# Patient Record
Sex: FEMALE | Race: WHITE | Employment: STUDENT | URBAN - NONMETROPOLITAN AREA
[De-identification: names, ages, dates, MRNs, and addresses within clinical notes are randomized per-mention and may not be internally consistent; named-entity substitution may affect disease eponyms.]

---
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
## 2025-01-02 ENCOUNTER — OFFICE VISIT (OUTPATIENT)
Dept: SURGERY | Facility: CLINIC | Age: 20
End: 2025-01-02
Payer: COMMERCIAL

## 2025-01-02 VITALS
DIASTOLIC BLOOD PRESSURE: 74 MMHG | BODY MASS INDEX: 18.07 KG/M2 | WEIGHT: 102 LBS | SYSTOLIC BLOOD PRESSURE: 119 MMHG | HEIGHT: 63 IN

## 2025-01-02 DIAGNOSIS — Z90.49 S/P LAPAROSCOPIC CHOLECYSTECTOMY: Primary | ICD-10-CM

## 2025-01-02 DIAGNOSIS — K82.8 BILIARY DYSKINESIA: ICD-10-CM

## 2025-01-02 PROCEDURE — 99024 POSTOP FOLLOW-UP VISIT: CPT

## 2025-01-02 NOTE — PROGRESS NOTES
"Patient: Patty Cox    YOB: 2005    Date: 01/02/2025    Primary Care Provider: Radha Vazquez PA    Vital Signs:   Vitals:    01/02/25 0919   BP: 119/74   Weight: 46.3 kg (102 lb)   Height: 160.2 cm (63.07\")       Overall doing well s/p robotic assisted laparoscopic cholecystectomy by Dr. Sevilla on 12/13/24. No fevers. Symptoms improved. Tolerating diet. Energy improving. Pain improving. Bowels moving ok. Sclera anicteric. Wounds healing well. No significant abdominal tenderness on exam. No evidence of jaundice or scleral icterus.     Results Review:   Pathology and operative findings reviewed with patient.    Tissue Pathology Exam (12/13/2024 07:22)   Gallbladder:  - Gross and Histologic Changes of Chronic Cholecystitis.    Assessment / Plan:    Diagnoses and all orders for this visit:    1. S/P laparoscopic cholecystectomy (Primary)    2. Biliary dyskinesia        Patty Cox is 3 weeks s/p laparoscopic cholecystectomy. She is overall doing well. At this time, she is able to return to normal activity as tolerated. She voiced understanding of this. She will call for an appointment if she has any new problems or concerns. She is agreeable to the plan.     Follow up:     Return if symptoms worsen or fail to improve.        Electronically signed by Adore De La Cruz PA-C  01/02/25  09:30 CST                  "

## 2025-05-06 ENCOUNTER — TRANSCRIBE ORDERS (OUTPATIENT)
Dept: ADMINISTRATIVE | Facility: HOSPITAL | Age: 20
End: 2025-05-06
Payer: COMMERCIAL

## 2025-05-06 DIAGNOSIS — R19.7 DIARRHEA, UNSPECIFIED TYPE: Primary | ICD-10-CM

## 2025-06-10 ENCOUNTER — HOSPITAL ENCOUNTER (OUTPATIENT)
Dept: CT IMAGING | Facility: HOSPITAL | Age: 20
Discharge: HOME | End: 2025-06-10
Admitting: FAMILY MEDICINE
Payer: COMMERCIAL

## 2025-06-10 DIAGNOSIS — R19.7 DIARRHEA, UNSPECIFIED TYPE: ICD-10-CM

## 2025-06-10 PROCEDURE — 74177 CT ABD & PELVIS W/CONTRAST: CPT

## 2025-06-10 PROCEDURE — 25510000001 IOPAMIDOL 61 % SOLUTION: Performed by: FAMILY MEDICINE

## 2025-06-10 RX ORDER — IOPAMIDOL 612 MG/ML
100 INJECTION, SOLUTION INTRAVASCULAR
Status: COMPLETED | OUTPATIENT
Start: 2025-06-10 | End: 2025-06-10

## 2025-06-10 RX ADMIN — IOPAMIDOL 100 ML: 612 INJECTION, SOLUTION INTRAVENOUS at 08:48

## 2025-06-24 NOTE — PROGRESS NOTES
Subjective    Ms. Cox is 19 y.o. female    Chief Complaint: abnormal finding on CT;bladder wall thickening    History of Present Illness    19-year-old female new patient referred by PCP due to abnormal finding on recent CT of the abdomen and pelvis imaging for workup of abdominal pain where patient was noted to have circumferential bladder wall thickening.  Denies any problems with recurrent urinary tract infections.  Patient reports a previous history at the age of 12 requiring urethral dilation at [facility].  No further issues since.  Does report baseline urine frequency every hour for which patient states has been ongoing since patient was a child.  Previously treated years ago with oxybutynin.  Is unsure why ever stopped the medication.    No tobacco use history.  No family history of bladder or renal cell CA.    The following portions of the patient's history were reviewed and updated as appropriate: allergies, current medications, past family history, past medical history, past social history, past surgical history and problem list.    Review of Systems   Constitutional:  Negative for chills and fever.   Gastrointestinal:  Negative for abdominal pain, anal bleeding and blood in stool.   Genitourinary:  Positive for frequency and urgency. Negative for dysuria and hematuria.         Current Outpatient Medications:     acetaminophen (Tylenol) 325 MG tablet, Take 3 tablets by mouth Every 8 (Eight) Hours. Take every 8 hours for 3 days then take prn as needed., Disp: , Rfl:     bisacodyl (Dulcolax) 5 MG EC tablet, Take 1 tablet by mouth Daily As Needed for Constipation (take 2 today)., Disp: 10 tablet, Rfl: 0    busPIRone (BUSPAR) 5 MG tablet, Take 1 tablet by mouth Every Morning for 7 days, THEN 1 tablet 2 (Two) Times a Day, Disp: 60 tablet, Rfl: 2    dicyclomine (BENTYL) 10 MG capsule, Take 1 capsule by mouth 4 (Four) Times a Day Before Meals & at Bedtime., Disp: 120 capsule, Rfl: 2     "ibuprofen (Motrin IB) 200 MG tablet, Take 3 tablets by mouth Every 8 (Eight) Hours. Take every 8 hours for three days then take as needed., Disp: , Rfl:     oxybutynin (DITROPAN) 5 MG tablet, Take 1 tablet by mouth 3 (Three) Times a Day., Disp: 30 tablet, Rfl: 2    Past Medical History:   Diagnosis Date    Anxiety 2020    Bladder spasms     Broken arm     Right       Past Surgical History:   Procedure Laterality Date    ARM TENDON REPAIR Right     broken right arm    CHOLECYSTECTOMY N/A 12/13/2024    Procedure: LAPAROSCOPIC ROBOTIC ASSISTED BIKINI LINE CHOLECYSTECTOMY WTIH PRE-OPERATIVE INDOCYANINE GREEN INJECTION;  Surgeon: Suzanne Sevilla MD;  Location:  [facility] OR;  Service: Robotics - DaVinci;  Laterality: N/A;    COLONOSCOPY N/A 11/8/2024    Procedure: COLONOSCOPY WITH ANESTHESIA;  Surgeon: Barbara Mehta MD;  Location:  [facility] ENDOSCOPY;  Service: Gastroenterology;  Laterality: N/A;  pre op: Abdominal pain  post op: normal  PCP: Radha Vazquez/KATHY CLOSED REDUCTION Right 05/10/2019    Procedure: CLOSED REDUCTION RIGHT FOREARM - RIGHT;  Surgeon: Hussein Lopez MD;  Location:  [facility] OR;  Service: Orthopedics    URETEROSCOPY         Social History     Socioeconomic History    Marital status: Single   Tobacco Use    Smoking status: Never    Smokeless tobacco: Never   Vaping Use    Vaping status: Never Used   Substance and Sexual Activity    Alcohol use: No    Drug use: No    Sexual activity: Defer       Family History   Problem Relation Age of Onset    Hypertension Maternal Grandmother     Hyperlipidemia Maternal Grandmother     Cancer Maternal Grandmother     Skin cancer Maternal Grandmother         Basal cell carcinoma    Stomach cancer Paternal Grandmother     Colon cancer Paternal Grandmother     Thyroid disease Paternal Grandmother     Ovarian cancer Paternal Grandmother     No Known Problems Paternal Grandfather        Objective    Temp 96.8 °F (36 °C)   Ht 160 cm (63\")   Wt 48.4 kg (106 lb " 9.6 oz)   BMI 18.88 kg/m²     Physical Exam        Results for orders placed or performed in visit on 07/01/25   POC Urinalysis Dipstick, Multipro    Collection Time: 07/01/25  9:28 AM    Specimen: Urine   Result Value Ref Range    Color Yellow Yellow, Straw, Dark Yellow, Tammi    Clarity, UA Clear Clear    Glucose, UA Negative Negative mg/dL    Bilirubin Small (1+) (A) Negative    Ketones, UA Trace (A) Negative    Specific Gravity  1.030 1.005 - 1.030    Blood, UA Trace (A) Negative    pH, Urine 6.0 5.0 - 8.0    Protein, POC 30 mg/dL (A) Negative mg/dL    Urobilinogen, UA 0.2 E.U./dL Normal, 0.2 E.U./dL    Nitrite, UA Negative Negative    Leukocytes Negative Negative   Estimation of residual urine via abdominal ultrasound  Residual Urine: 46 ml  Indication: bladder wall thickening  Position: Supine  Examination: Incremental scanning of the suprapubic area using 3 MHz transducer using copious amounts of acoustic gel.   Findings: An anechoic area was demonstrated which represented the bladder, with measurement of residual urine as noted. I inspected this myself. In that the residual urine was stable or insignificant, no treatment will be necessary at this time.    CT Abdomen Pelvis With Contrast (06/10/2025 08:47)   Assessment and Plan    Diagnoses and all orders for this visit:    1. Bladder wall thickening (Primary)  -     POC Urinalysis Dipstick, Multipro    2. Urine frequency  -     oxybutynin (DITROPAN) 5 MG tablet; Take 1 tablet by mouth 3 (Three) Times a Day.  Dispense: 30 tablet; Refill: 2      On review of patient CT imaging patient with circumferential bladder wall thickening appears due to underdistention as patient voided prior to study.  No other urologic abnormality was found.  Patient 19 years old with no risk factors therefore do not feel is necessary to proceed with cystoscopy evaluation at this time.    Patient does report a history of urinary frequency previously maintained on oxybutynin.  I will  send in an as needed version of oxybutynin 5 mg for patient to use up to 3 times daily specifically when traveling as patient states she is currently not bothered any other times.    Follow-up as needed

## 2025-07-01 ENCOUNTER — OFFICE VISIT (OUTPATIENT)
Dept: UROLOGY | Facility: CLINIC | Age: 20
End: 2025-07-01
Payer: COMMERCIAL

## 2025-07-01 VITALS — WEIGHT: 106.6 LBS | TEMPERATURE: 96.8 F | HEIGHT: 63 IN | BODY MASS INDEX: 18.89 KG/M2

## 2025-07-01 DIAGNOSIS — N32.89 BLADDER WALL THICKENING: Primary | ICD-10-CM

## 2025-07-01 DIAGNOSIS — R35.0 URINE FREQUENCY: ICD-10-CM

## 2025-07-01 LAB
BILIRUB BLD-MCNC: ABNORMAL MG/DL
CLARITY, POC: CLEAR
COLOR UR: YELLOW
GLUCOSE UR STRIP-MCNC: NEGATIVE MG/DL
KETONES UR QL: ABNORMAL
LEUKOCYTE EST, POC: NEGATIVE
NITRITE UR-MCNC: NEGATIVE MG/ML
PH UR: 6 [PH] (ref 5–8)
PROT UR STRIP-MCNC: ABNORMAL MG/DL
RBC # UR STRIP: ABNORMAL /UL
SP GR UR: 1.03 (ref 1–1.03)
UROBILINOGEN UR QL: ABNORMAL

## 2025-07-01 RX ORDER — OXYBUTYNIN CHLORIDE 5 MG/1
5 TABLET ORAL 3 TIMES DAILY
Qty: 30 TABLET | Refills: 2 | Status: SHIPPED | OUTPATIENT
Start: 2025-07-01

## (undated) DEVICE — ARM SLING II: Brand: DEROYAL

## (undated) DEVICE — GLV SURG SENSICARE W/ALOE PF LF 6.5 STRL

## (undated) DEVICE — BNDG PLSTR SPECIALIST FAST 3IN 3YD LF

## (undated) DEVICE — SENSR O2 OXIMAX FNGR A/ 18IN NONSTR

## (undated) DEVICE — BLADELESS OBTURATOR: Brand: WECK VISTA

## (undated) DEVICE — COTTON UNDERCAST PADDING,REGULAR FINISH: Brand: WEBRIL

## (undated) DEVICE — MASK,OXYGEN,MED CONC,ADLT,7' TUB, UC: Brand: PENDING

## (undated) DEVICE — APPL CHLORAPREP HI/LITE 26ML ORNG

## (undated) DEVICE — TISSUE RETRIEVAL SYSTEM: Brand: INZII RETRIEVAL SYSTEM

## (undated) DEVICE — NDL HYPO PRECISIONGLIDE REG 21G 1 1/2

## (undated) DEVICE — SYR LL TP 10ML STRL

## (undated) DEVICE — YANKAUER,BULB TIP WITH VENT: Brand: ARGYLE

## (undated) DEVICE — KT CLN CLEANOR SCPE

## (undated) DEVICE — SUT MNCRYL 4/0 PS2 27IN UD MCP426H

## (undated) DEVICE — GLV SURG DERMASSURE GRN LF PF 7.0

## (undated) DEVICE — Device: Brand: DEFENDO AIR/WATER/SUCTION AND BIOPSY VALVE

## (undated) DEVICE — SEAL

## (undated) DEVICE — 2, DISPOSABLE SUCTION/IRRIGATOR WITHOUT DISPOSABLE TIP: Brand: STRYKEFLOW

## (undated) DEVICE — CUFF,BP,DISP,1 TUBE,ADULT,HP: Brand: MEDLINE

## (undated) DEVICE — ADHS SKIN PREMIERPRO EXOFIN TOPICAL HI/VISC .5ML

## (undated) DEVICE — THE CHANNEL CLEANING BRUSH IS A NYLON FLEXI BRUSH ATTACHED TO A FLEXIBLE PLASTIC SHEATH DESIGNED TO SAFELY REMOVE DEBRIS FROM FLEXIBLE ENDOSCOPES.

## (undated) DEVICE — BNDG PLSTR SPECIALIST FAST 4IN 5YD LF

## (undated) DEVICE — ARM DRAPE

## (undated) DEVICE — GLOVE,SURG,SENSICARE,ALOE,LF,PF,6: Brand: MEDLINE

## (undated) DEVICE — TBG INSUFFLATION LUER LOCK: Brand: MEDLINE INDUSTRIES, INC.

## (undated) DEVICE — PATIENT RETURN ELECTRODE, SINGLE-USE, CONTACT QUALITY MONITORING, ADULT, WITH 9FT CORD, FOR PATIENTS WEIGING OVER 33LBS. (15KG): Brand: MEGADYNE

## (undated) DEVICE — ENDOPATH XCEL WITH OPTIVIEW TECHNOLOGY BLADELESS TROCARS WITH STABILITY SLEEVES: Brand: ENDOPATH XCEL OPTIVIEW

## (undated) DEVICE — ELECTRD BLD EZ CLN MOD XLNG 2.75IN

## (undated) DEVICE — DAVINCI: Brand: MEDLINE INDUSTRIES, INC.